# Patient Record
Sex: MALE | Race: WHITE | NOT HISPANIC OR LATINO | Employment: FULL TIME | URBAN - METROPOLITAN AREA
[De-identification: names, ages, dates, MRNs, and addresses within clinical notes are randomized per-mention and may not be internally consistent; named-entity substitution may affect disease eponyms.]

---

## 2019-08-27 ENCOUNTER — APPOINTMENT (OUTPATIENT)
Dept: RADIOLOGY | Facility: IMAGING CENTER | Age: 34
End: 2019-08-27
Attending: CHIROPRACTOR
Payer: COMMERCIAL

## 2019-08-27 ENCOUNTER — APPOINTMENT (OUTPATIENT)
Dept: URGENT CARE | Facility: PHYSICIAN GROUP | Age: 34
End: 2019-08-27
Payer: COMMERCIAL

## 2019-08-27 DIAGNOSIS — M54.5 LOW BACK PAIN, UNSPECIFIED BACK PAIN LATERALITY, UNSPECIFIED CHRONICITY, WITH SCIATICA PRESENCE UNSPECIFIED: ICD-10-CM

## 2019-08-27 PROCEDURE — 72170 X-RAY EXAM OF PELVIS: CPT | Mod: TC | Performed by: FAMILY MEDICINE

## 2019-08-27 PROCEDURE — 72100 X-RAY EXAM L-S SPINE 2/3 VWS: CPT | Mod: TC | Performed by: FAMILY MEDICINE

## 2019-10-25 ENCOUNTER — OFFICE VISIT (OUTPATIENT)
Dept: URGENT CARE | Facility: PHYSICIAN GROUP | Age: 34
End: 2019-10-25
Payer: COMMERCIAL

## 2019-10-25 VITALS
HEIGHT: 69 IN | BODY MASS INDEX: 24.59 KG/M2 | DIASTOLIC BLOOD PRESSURE: 72 MMHG | HEART RATE: 79 BPM | RESPIRATION RATE: 16 BRPM | TEMPERATURE: 98.2 F | SYSTOLIC BLOOD PRESSURE: 110 MMHG | OXYGEN SATURATION: 97 % | WEIGHT: 166 LBS

## 2019-10-25 DIAGNOSIS — G56.03 BILATERAL CARPAL TUNNEL SYNDROME: ICD-10-CM

## 2019-10-25 DIAGNOSIS — M54.32 LEFT SIDED SCIATICA: ICD-10-CM

## 2019-10-25 PROCEDURE — 99204 OFFICE O/P NEW MOD 45 MIN: CPT | Performed by: FAMILY MEDICINE

## 2019-10-25 RX ORDER — METHYLPREDNISOLONE 4 MG/1
TABLET ORAL
Qty: 21 TAB | Refills: 0 | Status: SHIPPED | OUTPATIENT
Start: 2019-10-25

## 2019-10-25 RX ORDER — KETOROLAC TROMETHAMINE 30 MG/ML
60 INJECTION, SOLUTION INTRAMUSCULAR; INTRAVENOUS ONCE
Status: COMPLETED | OUTPATIENT
Start: 2019-10-25 | End: 2019-10-25

## 2019-10-25 RX ADMIN — KETOROLAC TROMETHAMINE 60 MG: 30 INJECTION, SOLUTION INTRAMUSCULAR; INTRAVENOUS at 17:14

## 2019-10-25 SDOH — HEALTH STABILITY: MENTAL HEALTH: HOW OFTEN DO YOU HAVE A DRINK CONTAINING ALCOHOL?: 2-4 TIMES A MONTH

## 2019-10-26 NOTE — PROGRESS NOTES
Chief Complaint:    Chief Complaint   Patient presents with   • Leg Pain     x 1 month an getting worse sharp stabbing pain   • Carpal Tunnel     right hand       History of Present Illness:    He is here for multiple.    For 1 month, he has pain going from left buttock down back of leg leg and sometimes will feel numb in left leg. Overall at least moderate severity and not getting better. No similar prior episodes. People at work told him he could have sciatica. No injury or trauma. No loss of bowel/bladder control.    He also has history or carpal tunnel in hands for which Medrol Dose Eagle worked and was tolerated. He feels the carpal tunnel is flaring again.    Chiropractor ordered L-spine and Pelvis x-rays done 8/27/19 with results noted below.    DX-LUMBAR SPINE-2 OR 3 VIEWS   Order: [id number]   Status:  Final result   Visible to patient:  No (Inaccessible in MyChart) Next appt:  None Dx:  Low back pain, unspecified back pain ...   Details     Reading Physician Reading Date Result Priority   Casie Jo M.D. 8/27/2019 Routine      Narrative       8/27/2019 3:21 PM    HISTORY/REASON FOR EXAM:  Atraumatic Pain      TECHNIQUE/ EXAM DESCRIPTION AND NUMBER OF VIEWS:  2 views of the lumbar spine.    COMPARISON: None.    FINDINGS:  There are 5 nonrib-bearing lumbar vertebra. No compression fracture is identified. Intervertebral disc spaces are maintained. Alignment of the facet joints is maintained. SI joints are symmetric.        Impression       No fracture or subluxation is identified.           DX-PELVIS-1 OR 2 VIEWS   Order: [id number]   Status:  Final result   Visible to patient:  No (Inaccessible in MyChart) Next appt:  None Dx:  Low back pain, unspecified back pain ...   Details     Reading Physician Reading Date Result Priority   Casie Jo M.D. 8/27/2019 Routine      Narrative       8/27/2019 3:21 PM    HISTORY/REASON FOR EXAM:  Atraumatic Pelvic/Hip Pain.      TECHNIQUE/EXAM DESCRIPTION AND  NUMBER OF VIEWS:  1 view(s) of the pelvis.    COMPARISON:  None.    FINDINGS:  Femoral heads are seated within the acetabula. Joint spaces are maintained. No fracture or dislocation is seen. There is no diastases of the symphysis pubis. SI joints are symmetric. Calcific densities in the pelvis likely represent phleboliths.      Impression       No fracture or dislocation is seen.             Review of Systems:    Constitutional: Negative for fever, chills, and diaphoresis.   Eyes: Negative for change in vision, photophobia, pain, redness, and discharge.  ENT: Negative for ear pain, ear discharge, hearing loss, tinnitus, nasal congestion, nosebleeds, and sore throat.    Respiratory: Negative for cough, hemoptysis, sputum production, shortness of breath, wheezing, and stridor.    Cardiovascular: Negative for chest pain, palpitations, orthopnea, claudication, leg swelling, and PND.   Gastrointestinal: Negative for abdominal pain, nausea, vomiting, diarrhea, constipation, blood in stool, and melena.   Genitourinary: Negative for dysuria, urinary urgency, urinary frequency, hematuria, and flank pain.   Musculoskeletal: See HPI.  Skin: Negative for rash and itching.   Neurological: See HPI.  Endo: Negative for polydipsia.   Heme: Does not bruise/bleed easily.   Psychiatric/Behavioral: Negative for depression, suicidal ideas, hallucinations, memory loss and substance abuse. The patient is not nervous/anxious and does not have insomnia.        Past Medical History:    History reviewed. No pertinent past medical history.    Past Surgical History:    History reviewed. No pertinent surgical history.    Social History:    Social History     Socioeconomic History   • Marital status: Single     Spouse name: Not on file   • Number of children: Not on file   • Years of education: Not on file   • Highest education level: Not on file   Occupational History   • Not on file   Social Needs   • Financial resource strain: Not on file   •  "Food insecurity:     Worry: Not on file     Inability: Not on file   • Transportation needs:     Medical: Not on file     Non-medical: Not on file   Tobacco Use   • Smoking status: Current Every Day Smoker     Packs/day: 0.50     Years: 14.00     Pack years: 7.00     Types: Cigarettes   • Smokeless tobacco: Never Used   Substance and Sexual Activity   • Alcohol use: Yes     Frequency: 2-4 times a month   • Drug use: Never   • Sexual activity: Not on file   Lifestyle   • Physical activity:     Days per week: Not on file     Minutes per session: Not on file   • Stress: Not on file   Relationships   • Social connections:     Talks on phone: Not on file     Gets together: Not on file     Attends Jainism service: Not on file     Active member of club or organization: Not on file     Attends meetings of clubs or organizations: Not on file     Relationship status: Not on file   • Intimate partner violence:     Fear of current or ex partner: Not on file     Emotionally abused: Not on file     Physically abused: Not on file     Forced sexual activity: Not on file   Other Topics Concern   • Not on file   Social History Narrative   • Not on file     Family History:    History reviewed. No pertinent family history.    Medications:    No current outpatient medications on file prior to visit.     No current facility-administered medications on file prior to visit.      Allergies:    Allergies   Allergen Reactions   • Amoxicillin    • Ceclor [Cefaclor]    • Keflex    • Pcn [Penicillins]        Vitals:    Vitals:    10/25/19 1639   BP: 110/72   Pulse: 79   Resp: 16   Temp: 36.8 °C (98.2 °F)   TempSrc: Temporal   SpO2: 97%   Weight: 75.3 kg (166 lb)   Height: 1.753 m (5' 9\")       Physical Exam:    Constitutional: Vital signs reviewed. Appears well-developed and well-nourished. No acute distress.   Eyes: Sclera white, conjunctivae clear.  ENT: External ears normal. Hearing normal.  Cardiovascular: Peripheral pulses 2+. No edema. "   Pulmonary/Chest: Respirations non-labored.  Musculoskeletal: Mildly decreased lumbar range of motion due to pain in left leg. Normal gait. Normal range of motion otherwise. No tenderness to palpation. No muscular atrophy or weakness.  Neurological: Alert and oriented to person, place, and time. Muscle tone normal. Coordination normal. Light touch and sensation normal. Reflexes 2+.  Skin: No rashes or lesions. Warm, dry, normal turgor.  Psychiatric: Normal mood and affect. Behavior is normal. Judgment and thought content normal.       Assessment / Plan:    1. Left sided sciatica  - ketorolac (TORADOL) injection 60 mg  - methylPREDNISolone (MEDROL DOSEPAK) 4 MG Tablet Therapy Pack; TAKE AS DIRECTED ON PACKAGE.  Dispense: 21 Tab; Refill: 0    2. Bilateral carpal tunnel syndrome  - ketorolac (TORADOL) injection 60 mg  - methylPREDNISolone (MEDROL DOSEPAK) 4 MG Tablet Therapy Pack; TAKE AS DIRECTED ON PACKAGE.  Dispense: 21 Tab; Refill: 0      Discussed with him DDX, management options, and risks, benefits, and alternatives to treatment plan agreed upon.    Rec'd relative rest.    Agreeable to potent anti-inflammatory treatment with medications given and prescribed.    He will see [facility] for follow-up if getting worse or symptoms persist.    Discussed expected course of duration, time for improvement, and to seek follow-up in Emergency Room, urgent care, or with PCP if getting worse at any time or not improving within expected time frame.